# Patient Record
Sex: MALE | Race: WHITE | NOT HISPANIC OR LATINO | Employment: OTHER | URBAN - NONMETROPOLITAN AREA
[De-identification: names, ages, dates, MRNs, and addresses within clinical notes are randomized per-mention and may not be internally consistent; named-entity substitution may affect disease eponyms.]

---

## 2018-05-14 ENCOUNTER — TRANSCRIBE ORDERS (OUTPATIENT)
Dept: ADMINISTRATIVE | Facility: HOSPITAL | Age: 78
End: 2018-05-14

## 2018-05-14 DIAGNOSIS — E78.00 PURE HYPERCHOLESTEROLEMIA: Primary | ICD-10-CM

## 2021-05-26 ENCOUNTER — TRANSCRIBE ORDERS (OUTPATIENT)
Dept: LAB | Facility: HOSPITAL | Age: 81
End: 2021-05-26

## 2021-05-26 DIAGNOSIS — E78.00 PURE HYPERCHOLESTEROLEMIA: ICD-10-CM

## 2021-05-26 DIAGNOSIS — E11.9 DIABETES MELLITUS WITHOUT COMPLICATION (HCC): Primary | ICD-10-CM

## 2022-04-07 ENCOUNTER — TRANSCRIBE ORDERS (OUTPATIENT)
Dept: LAB | Facility: HOSPITAL | Age: 82
End: 2022-04-07

## 2022-04-07 DIAGNOSIS — E78.00 PURE HYPERCHOLESTEROLEMIA: Primary | ICD-10-CM

## 2022-04-07 DIAGNOSIS — E11.9 DIABETES MELLITUS WITHOUT COMPLICATION: ICD-10-CM

## 2023-01-26 ENCOUNTER — TELEPHONE (OUTPATIENT)
Dept: CARDIOLOGY | Facility: CLINIC | Age: 83
End: 2023-01-26
Payer: COMMERCIAL

## 2023-02-09 NOTE — TELEPHONE ENCOUNTER
PER DR. MIRANDA, GOT PATIENT BUMPED UP FOR APPT IN 2-3 WEEKS.  TOOTIE KAMALA AND CHANGED APPT, STATES UNDERSTANDING.

## 2023-03-03 ENCOUNTER — OFFICE VISIT (OUTPATIENT)
Dept: CARDIOLOGY | Facility: CLINIC | Age: 83
End: 2023-03-03
Payer: MEDICARE

## 2023-03-03 VITALS
HEIGHT: 67 IN | HEART RATE: 83 BPM | WEIGHT: 178 LBS | BODY MASS INDEX: 27.94 KG/M2 | OXYGEN SATURATION: 98 % | TEMPERATURE: 98.8 F | DIASTOLIC BLOOD PRESSURE: 78 MMHG | SYSTOLIC BLOOD PRESSURE: 126 MMHG

## 2023-03-03 DIAGNOSIS — R01.1 CARDIAC MURMUR: Primary | ICD-10-CM

## 2023-03-03 DIAGNOSIS — Z01.810 PREOP CARDIOVASCULAR EXAM: ICD-10-CM

## 2023-03-03 PROCEDURE — 93000 ELECTROCARDIOGRAM COMPLETE: CPT | Performed by: INTERNAL MEDICINE

## 2023-03-03 PROCEDURE — 99204 OFFICE O/P NEW MOD 45 MIN: CPT | Performed by: INTERNAL MEDICINE

## 2023-03-03 RX ORDER — ATORVASTATIN CALCIUM 10 MG/1
TABLET, FILM COATED ORAL
COMMUNITY
Start: 2023-02-03

## 2023-03-03 RX ORDER — OLMESARTAN MEDOXOMIL 40 MG/1
TABLET ORAL
COMMUNITY

## 2023-03-03 RX ORDER — OMEPRAZOLE 20 MG/1
CAPSULE, DELAYED RELEASE ORAL
COMMUNITY

## 2023-03-03 RX ORDER — ASPIRIN 81 MG/1
TABLET, CHEWABLE ORAL EVERY 24 HOURS
COMMUNITY

## 2023-03-03 RX ORDER — MULTIPLE VITAMINS W/ MINERALS TAB 9MG-400MCG
TAB ORAL
COMMUNITY

## 2023-03-03 RX ORDER — TAMSULOSIN HYDROCHLORIDE 0.4 MG/1
CAPSULE ORAL
COMMUNITY

## 2023-03-03 RX ORDER — FLUOCINONIDE TOPICAL SOLUTION USP, 0.05% 0.5 MG/ML
SOLUTION TOPICAL
COMMUNITY

## 2023-03-03 RX ORDER — METFORMIN HYDROCHLORIDE 500 MG/1
TABLET, EXTENDED RELEASE ORAL
COMMUNITY

## 2023-03-03 RX ORDER — ALLOPURINOL 300 MG/1
TABLET ORAL
COMMUNITY

## 2023-03-03 RX ORDER — METOPROLOL SUCCINATE 25 MG/1
TABLET, EXTENDED RELEASE ORAL
COMMUNITY

## 2023-03-03 NOTE — PROGRESS NOTES
[facility] Cardiology OP Consult Note    Ramirez Oconnor  [id number]  2023    Referred By: Violette You MD    Chief Complaint: Preoperative cardiac risk assessment    History of Present Illness:   Mr. Ramirez Oconnor is a 82 y.o. male who presents to the Cardiology Clinic for preoperative cardiac risk assessment.  The patient has a past medical history significant for hyperlipidemia, hypertension, and type 2 diabetes mellitus.  He does not have any significant past cardiac history.  He presents today for preoperative cardiac risk assessment prior to undergoing colonoscopy.  On his preoperative testing, he was noted to have a murmur.  He presents today for further evaluation.  Currently, the patient reports he is active and denies chest pain or exertional anginal symptoms.  No significant exertional dyspnea.  He denies any significant episodes of palpitations.  No history of orthopnea, PND, or significant lower extremity swelling.  He denies any presyncopal or syncopal events.  No other specific complaints today.    Past Cardiac Testin. Last Coronary Angio: None  2. Prior Stress Testing: None  3. Last Echo: None  4. Prior Holter Monitor: None    Review of Systems:   Review of Systems   Constitutional: Negative for activity change, appetite change, chills, diaphoresis, fatigue, fever, unexpected weight gain and unexpected weight loss.   Eyes: Negative for blurred vision and double vision.   Respiratory: Negative for cough, chest tightness, shortness of breath and wheezing.    Cardiovascular: Negative for chest pain, palpitations and leg swelling.   Gastrointestinal: Negative for abdominal pain, anal bleeding, blood in stool and GERD.   Endocrine: Negative for cold intolerance and heat intolerance.   Genitourinary: Negative for hematuria.   Neurological: Negative for dizziness, syncope, weakness and light-headedness.   Hematological: Does not bruise/bleed easily.   Psychiatric/Behavioral:  "Negative for depressed mood and stress. The patient is not nervous/anxious.        Past Medical History: History reviewed. No pertinent past medical history.    Past Surgical History: History reviewed. No pertinent surgical history.    Family History: History reviewed. No pertinent family history.    Social History:   Social History     Socioeconomic History   • Marital status:        Medications:     Current Outpatient Medications:   •  allopurinol (ZYLOPRIM) 300 MG tablet, allopurinol 300 mg tablet, Disp: , Rfl:   •  aspirin 81 MG chewable tablet, Daily., Disp: , Rfl:   •  atorvastatin (LIPITOR) 10 MG tablet, , Disp: , Rfl:   •  empagliflozin (JARDIANCE) 25 MG tablet tablet, Daily., Disp: , Rfl:   •  fluocinonide (LIDEX) 0.05 % external solution, fluocinonide 0.05 % topical solution  APPLY SOLUTION TOPICALLY TWICE DAILY AS NEEDED, Disp: , Rfl:   •  metFORMIN ER (GLUCOPHAGE-XR) 500 MG 24 hr tablet, metformin  mg tablet,extended release 24 hr  TAKE 2 TABLETS TWICE A DAY, Disp: , Rfl:   •  metoprolol succinate XL (TOPROL-XL) 25 MG 24 hr tablet, metoprolol succinate ER 25 mg tablet,extended release 24 hr  Take 2 tab po daily, Disp: , Rfl:   •  multivitamin with minerals tablet tablet, PreserVision AREDS-2  daily, Disp: , Rfl:   •  olmesartan (BENICAR) 40 MG tablet, olmesartan 40 mg tablet  TAKE 1 TABLET DAILY, Disp: , Rfl:   •  omeprazole (priLOSEC) 20 MG capsule, omeprazole 20 mg capsule,delayed release  Take one daily, Disp: , Rfl:   •  tamsulosin (FLOMAX) 0.4 MG capsule 24 hr capsule, tamsulosin 0.4 mg capsule  Take one tablet twice daily, Disp: , Rfl:     Allergies:   Allergies   Allergen Reactions   • Ace Inhibitors Cough   • Losartan Cough       Physical Exam:  Vital Signs:   Vitals:    03/03/23 1031   BP: 126/78   Pulse: 83   Temp: 98.8 °F (37.1 °C)   SpO2: 98%   Weight: 80.7 kg (178 lb)   Height: 170.2 cm (67\")       Physical Exam  Constitutional:       General: He is not in acute distress.     " Appearance: Normal appearance. He is not diaphoretic.   HENT:      Head: Normocephalic and atraumatic.   Cardiovascular:      Rate and Rhythm: Normal rate and regular rhythm.      Heart sounds: Murmur heard.   Pulmonary:      Effort: Pulmonary effort is normal. No respiratory distress.      Breath sounds: Normal breath sounds. No stridor. No wheezing, rhonchi or rales.   Abdominal:      General: Bowel sounds are normal. There is no distension.      Palpations: Abdomen is soft.      Tenderness: There is no abdominal tenderness. There is no guarding or rebound.   Musculoskeletal:         General: No swelling. Normal range of motion.      Cervical back: Neck supple. No tenderness.   Skin:     General: Skin is warm and dry.   Neurological:      General: No focal deficit present.      Mental Status: He is alert and oriented to person, place, and time.   Psychiatric:         Mood and Affect: Mood normal.         Behavior: Behavior normal.         Results Review:   I reviewed the patient's new clinical results.  I personally viewed and interpreted the patient's EKG/Telemetry data      ECG 12 Lead    Date/Time: 3/3/2023 11:40 AM  Performed by: Juan Sneed MD  Authorized by: Juan Sneed MD   Comparison: not compared with previous ECG   Rhythm: sinus rhythm  Rate: normal  Conduction: right bundle branch block  QRS axis: normal    Clinical impression: abnormal EKG            Assessment / Plan:     1. Cardiac murmur  -- Cardiac murmur noted on exam today suspicious for aortic stenosis  -- Appears to be asymptomatic  -- Will obtain baseline echocardiogram to further evaluate    2. Preop cardiovascular exam  -- Planning to undergo colonoscopy  -- ECG today shows right bundle branch block, no ischemic changes  -- No exertional angina  -- Cardiac murmur noted on exam today, echo pending  -- No evidence of decompensated CHF, valvulopathy, or arrhythmia  -- Pending patient's echocardiogram does not show severe aortic  stenosis, it would then be reasonable to proceed with the proposed procedure without further cardiac testing or interventions        Follow Up:   Return in about 1 year (around 3/3/2024).      Thank you for allowing me to participate in the care of your patient. Please to not hesitate to contact me with additional questions or concerns.     SANDRA Sneed MD  Interventional Cardiology   03/03/2023  10:34 EST

## 2023-08-08 ENCOUNTER — TELEPHONE (OUTPATIENT)
Dept: CARDIOLOGY | Facility: CLINIC | Age: 83
End: 2023-08-08
Payer: COMMERCIAL

## 2023-08-08 NOTE — TELEPHONE ENCOUNTER
Caller: EDER    Relationship: [facility]     Best call back number: [phone]    What form or medical record are you requesting: ASPIRIN STOP    Who is requesting this form or medical record from you: [facility]    How would you like to receive the form or medical records (pick-up, mail, fax): CALL OR FAX  If fax, what is the fax number: [phone] ATTN:EDER    Timeframe paperwork needed: ASAP    Additional notes: PATIENT IS SCHEDULED FOR SURGERY THE 15TH. CAN PATIENT BE OFF ASPIRIN?

## 2024-03-01 ENCOUNTER — TELEPHONE (OUTPATIENT)
Dept: CARDIOLOGY | Facility: CLINIC | Age: 84
End: 2024-03-01
Payer: COMMERCIAL